# Patient Record
Sex: MALE | Race: BLACK OR AFRICAN AMERICAN | NOT HISPANIC OR LATINO | URBAN - METROPOLITAN AREA
[De-identification: names, ages, dates, MRNs, and addresses within clinical notes are randomized per-mention and may not be internally consistent; named-entity substitution may affect disease eponyms.]

---

## 2021-08-24 ENCOUNTER — OFFICE (OUTPATIENT)
Dept: URBAN - METROPOLITAN AREA CLINIC 11 | Facility: CLINIC | Age: 56
End: 2021-08-24
Payer: COMMERCIAL

## 2021-08-24 VITALS
DIASTOLIC BLOOD PRESSURE: 86 MMHG | OXYGEN SATURATION: 98 % | HEIGHT: 67 IN | HEART RATE: 75 BPM | SYSTOLIC BLOOD PRESSURE: 186 MMHG | WEIGHT: 289 LBS

## 2021-08-24 DIAGNOSIS — K59.00 CONSTIPATION, UNSPECIFIED: ICD-10-CM

## 2021-08-24 DIAGNOSIS — R63.4 ABNORMAL WEIGHT LOSS: ICD-10-CM

## 2021-08-24 DIAGNOSIS — R10.10 UPPER ABDOMINAL PAIN, UNSPECIFIED: ICD-10-CM

## 2021-08-24 DIAGNOSIS — R12 HEARTBURN: ICD-10-CM

## 2021-08-24 DIAGNOSIS — K92.1 MELENA: ICD-10-CM

## 2021-08-24 PROCEDURE — 99204 OFFICE O/P NEW MOD 45 MIN: CPT | Performed by: NURSE PRACTITIONER

## 2021-08-24 RX ORDER — SODIUM PICOSULFATE, MAGNESIUM OXIDE, AND ANHYDROUS CITRIC ACID 10; 3.5; 12 MG/160ML; G/160ML; G/160ML
LIQUID ORAL
Qty: 1 | Refills: 0 | Status: ACTIVE
Start: 2021-08-24

## 2021-08-24 RX ORDER — OMEPRAZOLE 40 MG/1
40 CAPSULE, DELAYED RELEASE ORAL
Qty: 30 | Refills: 3 | Status: ACTIVE
Start: 2021-08-24

## 2021-08-24 NOTE — SERVICEHPINOTES
Justin Christine Jr.   is a   56   year old  male   here today at the request of Dr. Carey for evaluation of rectal bleeding and reflux.  The patient notes intermittent bright red blood after bowel movements and this has been worse recently.  This usually occurs when he has to strain for a bowel movement.  He has taken MiraLax and stool softeners which decrease his straining and he does not usually have blood.  He notes intermittent heartburn and reflux despite famotidine.  He denies dysphagia.  He also notes upper abdominal and chest pain that feels like trapped gas that has been ongoing for about a week.  He complains of weight loss of about 22 or 23 lb since 2018. His older brother was recently diagnosed with stomach cancer.  He states that he had a colonoscopy by Dr. Shelby in 2018 and there were couple of polyps removed.  His recent hematocrit was 37.5 with an MCV of 85. He denies any recent cardiovascular or respiratory symptoms.